# Patient Record
(demographics unavailable — no encounter records)

---

## 2025-05-08 NOTE — HISTORY OF PRESENT ILLNESS
[FreeTextEntry1] : Patient is present today for a subsequent Annual Wellness Visit. [de-identified] : Patient is a 71yr old male who is present today for a subsequent Annual Wellness Visit. With walking had a shortness of breath, happens initially on walking.  Denies any tightness of the chest palpitations lower extremity edema denies any chest pain upon lying.  Denies any smoking coughing or wheezing. Has appointment with cardiology in June. Has been checking blood pressure at home usually less than 140/90

## 2025-05-08 NOTE — ASSESSMENT
[Vaccines Reviewed] : Immunizations reviewed today. Please see immunization details in the vaccine log within the immunization flowsheet.  [FreeTextEntry1] :  Annual Physical Exam: 3-vessel CAD, Benign HTN, Shortness of breath, Carotid artery stenosis, Medicare annual wellness visit, subsequent - BP is elevated. Continue current management. - Check A1c, CBC, CMP, Lipid profile, PSA Total, Vitamin levels, Urinalysis, TSH - Start Nitroglycerin 0.4MG-Does have a history of CAD advised to to sit -EKG did show findings consistent with lateral ischemia the follow-up with cardiology advised to follow-up sooner name of Dr. Garcia provided for possible sooner appointment - Renew Metoprolol Tartrate 50MG - Order X-Ray Chest 2 Views PA/Lat, US Duplex Carotid Arteries Complete, Bilateral   - RTO annually or as needed.   Pt verbalized understanding and will reach should any questions/concerns occur.

## 2025-05-08 NOTE — HEALTH RISK ASSESSMENT
[Good] : ~his/her~  mood as  good [No] : In the past 12 months have you used drugs other than those required for medical reasons? No [No falls in past year] : Patient reported no falls in the past year [0] : 2) Feeling down, depressed, or hopeless: Not at all (0) [PHQ-2 Negative - No further assessment needed] : PHQ-2 Negative - No further assessment needed [Never] : Never [NO] : No [Fully functional (bathing, dressing, toileting, transferring, walking, feeding)] : Fully functional (bathing, dressing, toileting, transferring, walking, feeding) [Fully functional (using the telephone, shopping, preparing meals, housekeeping, doing laundry, using] : Fully functional and needs no help or supervision to perform IADLs (using the telephone, shopping, preparing meals, housekeeping, doing laundry, using transportation, managing medications and managing finances) [Reports normal functional visual acuity (ie: able to read med bottle)] : Reports normal functional visual acuity [Smoke Detector] : smoke detector [Carbon Monoxide Detector] : carbon monoxide detector [Safety elements used in home] : safety elements used in home [Seat Belt] :  uses seat belt [[id number]] : 0 [Feels Safe at Home] : Feels safe at home [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 [None] : Patient does not have any barriers to medication adherence [Yes] : Reviewed medication list for presence of high-risk medications. [de-identified] : Cigarette smoker [Change in mental status noted] : No change in mental status noted [Language] : denies difficulty with language [Behavior] : denies difficulty with behavior [Learning/Retaining New Information] : denies difficulty learning/retaining new information [Handling Complex Tasks] : denies difficulty handling complex tasks [Reasoning] : denies difficulty with reasoning [Spatial Ability and Orientation] : denies difficulty with spatial ability and orientation [With Significant Other] : lives with significant other [Reports changes in hearing] : Reports no changes in hearing [Reports changes in vision] : Reports no changes in vision [Reports changes in dental health] : Reports no changes in dental health [Sunscreen] : does not use sunscreen [Travel to Developing Areas] : does not  travel to developing areas [TB Exposure] : is not being exposed to tuberculosis [Caregiver Concerns] : does not have caregiver concerns [BoneDensityComments] : n/a [ColonoscopyComments] : n/a [Patient/Caregiver not ready to engage] : , patient/caregiver not ready to engage [AdvancecareDate] : 5/25

## 2025-05-08 NOTE — ADDENDUM
[FreeTextEntry1] : I, Elver Glez, acted as a scribe on behalf of Dr. Michael Chavez MD, on 05/07/2025.   All medical entries made by the scribe were at my, Dr. Michael Chavez MD, direction and personally dictated by me on 05/07/2025. I have reviewed the chart and agree that the record accurately reflects my personal performance of the history, physical exam, assessment and plan. I have also personally directed, reviewed, and agreed with the chart.

## 2025-05-08 NOTE — REASON FOR VISIT
[Annual Wellness Visit] : an annual wellness visit [FreeTextEntry1] : subsequent Annual Wellness Visit